# Patient Record
Sex: FEMALE | Race: WHITE | NOT HISPANIC OR LATINO | URBAN - NONMETROPOLITAN AREA
[De-identification: names, ages, dates, MRNs, and addresses within clinical notes are randomized per-mention and may not be internally consistent; named-entity substitution may affect disease eponyms.]

---

## 2018-12-20 PROBLEM — Z96.1: Noted: 2018-12-20

## 2018-12-20 PROBLEM — H25.811: Noted: 2018-12-20

## 2019-03-21 ENCOUNTER — IMPORTED ENCOUNTER (OUTPATIENT)
Dept: URBAN - NONMETROPOLITAN AREA CLINIC 1 | Facility: CLINIC | Age: 84
End: 2019-03-21

## 2019-03-21 PROCEDURE — 92014 COMPRE OPH EXAM EST PT 1/>: CPT

## 2019-03-21 NOTE — PATIENT DISCUSSION
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 -Reviewed symptoms of advancing cataract growth such as glare and halos and decreased vision.-Continue to monitor for now. Pt will notify us if any new symptoms develop. PSEUDOPHAKIA OSMONITOR

## 2019-11-05 ENCOUNTER — IMPORTED ENCOUNTER (OUTPATIENT)
Dept: URBAN - NONMETROPOLITAN AREA CLINIC 1 | Facility: CLINIC | Age: 84
End: 2019-11-05

## 2019-11-05 PROBLEM — Z96.1: Noted: 2019-11-05

## 2019-11-05 PROBLEM — H25.811: Noted: 2019-11-05

## 2019-11-05 PROBLEM — H04.123: Noted: 2019-11-05

## 2019-11-05 PROCEDURE — 92012 INTRM OPH EXAM EST PATIENT: CPT

## 2019-11-05 NOTE — PATIENT DISCUSSION
"""CONNER-Explained CONNER and associated symptoms.-Recommend increasing Omega 3s.-Pt to begin artificial tears OU QID PRN. Pt will contact us if this does not provide relief. Consider punctal plugs in that case. start lotemax sm bid ou x 10 days then cont tears tid ou"

## 2019-11-11 NOTE — PATIENT DISCUSSION
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 CALL BACK IMMEDIATELY FOR ANY WORSENING SYMPTOMS. RETURN FOR FOLLOW-UP AS SCHEDULED. PT ALREADY HAD MRI WITH DR. Tammie Suh AND CONTINUE FOLLOW UP WITH DR SANCHEZ FOR FURTHER TESTING. no fever and no chills.

## 2019-12-03 ENCOUNTER — IMPORTED ENCOUNTER (OUTPATIENT)
Dept: URBAN - NONMETROPOLITAN AREA CLINIC 1 | Facility: CLINIC | Age: 84
End: 2019-12-03

## 2019-12-03 PROCEDURE — 92014 COMPRE OPH EXAM EST PT 1/>: CPT

## 2019-12-03 NOTE — PATIENT DISCUSSION
"""CONNER-Explained CONNER and associated symptoms.-Recommend increasing Omega 3s.-Pt to begin artificial tears OU QID PRN. Pt will contact us if this does not provide relief. Consider punctal plugs in that case. Cataract OD-Not yet surgical. -Reviewed symptoms of advancing cataract growth such as glare and halos and decreased vision.-Continue to monitor for now. Pt will notify us if any new symptoms develop. PSEUDOPHAKIA OSMONITOR"

## 2021-10-11 ENCOUNTER — IMPORTED ENCOUNTER (OUTPATIENT)
Dept: URBAN - NONMETROPOLITAN AREA CLINIC 1 | Facility: CLINIC | Age: 86
End: 2021-10-11

## 2021-10-11 PROCEDURE — 92014 COMPRE OPH EXAM EST PT 1/>: CPT

## 2021-11-02 ENCOUNTER — IMPORTED ENCOUNTER (OUTPATIENT)
Dept: URBAN - NONMETROPOLITAN AREA CLINIC 1 | Facility: CLINIC | Age: 86
End: 2021-11-02

## 2021-11-02 NOTE — PATIENT DISCUSSION
CONNER-Explained CONNER and associated symptoms.-Recommend increasing Omega 3s.-Pt to begin artificial tears OU QID PRN. Pt will contact us if this does not provide relief. Consider punctal plugs in that case. Cataract OD-Not yet surgical. -Reviewed symptoms of advancing cataract growth such as glare and halos and decreased vision.-Continue to monitor for now. Pt will notify us if any new symptoms develop. PSEUDOPHAKIA OSMONITOR FOR PCO11/2/21 RX check remake lenses. Pt notice improvement.

## 2022-01-17 ENCOUNTER — PREPPED CHART (OUTPATIENT)
Dept: RURAL CLINIC 1 | Facility: CLINIC | Age: 87
End: 2022-01-17

## 2022-01-17 ENCOUNTER — IMPORTED ENCOUNTER (OUTPATIENT)
Dept: URBAN - NONMETROPOLITAN AREA CLINIC 1 | Facility: CLINIC | Age: 87
End: 2022-01-17

## 2022-01-17 NOTE — PATIENT DISCUSSION
rx checkrx givenDES-Explained CONNER and associated symptoms.-Recommend increasing Omega 3s.-Pt to begin artificial tears OU QID PRN. Pt will contact us if this does not provide relief. Consider punctal plugs in that case. Cataract OD-Not yet surgical. -Reviewed symptoms of advancing cataract growth such as glare and halos and decreased vision.-Continue to monitor for now. Pt will notify us if any new symptoms develop. PSEUDOPHAKIA OSMONITOR FOR PCO

## 2022-03-06 ASSESSMENT — VISUAL ACUITY
OD_CC: 20/25
OU_CC: 20/30
OS_CC: 20/40

## 2022-03-07 ENCOUNTER — ESTABLISHED PATIENT (OUTPATIENT)
Dept: RURAL CLINIC 1 | Facility: CLINIC | Age: 87
End: 2022-03-07

## 2022-03-07 DIAGNOSIS — H52.4: ICD-10-CM

## 2022-03-07 PROCEDURE — 99211 OFF/OP EST MAY X REQ PHY/QHP: CPT

## 2022-03-07 ASSESSMENT — VISUAL ACUITY
OS_CC: 20/40
OD_CC: 20/40
OS_CC: 20/25
OD_CC: 20/25

## 2022-04-09 ASSESSMENT — VISUAL ACUITY
OD_SC: 20/25-1
OS_CC: J2
OS_CC: J1
OS_SC: 20/30+1
OD_SC: 20/30
OD_CC: J2
OS_SC: 20/40
OD_CC: J1
OD_CC: J2
OS_CC: J2
OD_SC: 20/25-
OS_SC: 20/40-2
OD_SC: 20/40
OD_SC: 20/20-1
OS_SC: 20/30
OS_SC: 20/40

## 2022-04-09 ASSESSMENT — TONOMETRY
OD_IOP_MMHG: 13
OD_IOP_MMHG: 13
OS_IOP_MMHG: 14
OS_IOP_MMHG: 14
OD_IOP_MMHG: 14
OS_IOP_MMHG: 13

## 2023-01-31 RX ORDER — CALCIUM CARBONATE/VITAMIN D3 500 MG-10
TABLET,CHEWABLE ORAL
COMMUNITY

## 2023-01-31 RX ORDER — ASPIRIN 81 MG/1
TABLET ORAL
COMMUNITY

## 2023-01-31 RX ORDER — MEGESTROL ACETATE 40 MG/ML
10 SUSPENSION ORAL
COMMUNITY

## 2023-01-31 RX ORDER — LISINOPRIL 20 MG/1
TABLET ORAL
COMMUNITY